# Patient Record
Sex: MALE | URBAN - METROPOLITAN AREA
[De-identification: names, ages, dates, MRNs, and addresses within clinical notes are randomized per-mention and may not be internally consistent; named-entity substitution may affect disease eponyms.]

---

## 2022-08-31 ENCOUNTER — TELEPHONE (OUTPATIENT)
Dept: NEUROLOGY | Facility: CLINIC | Age: 37
End: 2022-08-31
Payer: MEDICAID

## 2023-10-05 ENCOUNTER — TELEPHONE (OUTPATIENT)
Dept: NEUROLOGY | Facility: CLINIC | Age: 38
End: 2023-10-05
Payer: MEDICAID

## 2023-10-05 NOTE — TELEPHONE ENCOUNTER
----- Message from Vinitabrennan Marcial sent at 10/5/2023 10:34 AM CDT -----  Contact: Rochelle/[facility]  Type:  Patient Requesting Call    Who Called:Rochelle  Regarding?:neurology referral 09/29  Would the patient rather a call back or a response via [facility]? Call back  Best Call Back Number:[phone] ask to speak with Rochelle  Additional Information: checking status of referral, please call back with approval or denial                  Thanks  MADIE